# Patient Record
Sex: FEMALE | Race: OTHER | URBAN - METROPOLITAN AREA
[De-identification: names, ages, dates, MRNs, and addresses within clinical notes are randomized per-mention and may not be internally consistent; named-entity substitution may affect disease eponyms.]

---

## 2023-05-22 ENCOUNTER — NURSE ONLY (OUTPATIENT)
Age: 43
End: 2023-05-22

## 2023-05-22 DIAGNOSIS — Z71.89 COUNSELING AND COORDINATION OF CARE: Primary | ICD-10-CM

## 2023-05-22 NOTE — PROGRESS NOTES
Encounter completed via telephone call for enrollment in to [facility] and coordination of care. Patient was [facility] by [facility] due to a recent abnormal screening mammogram performed at [facility] 03/2023 needing follow up images. Patient was screen and qualifies for the [facility] program. During a previous tel. Encounter patient stated that she also needed assistance for cervical diagnotic work up as she had recently had an abnormal pap smear. This nurse requested imaging reports and previous films from the [facility] and the requested information was received. This nurse also requested records from [facility] and have been received and reviewed. Documents have been scanned in to media section. Patient was called and updated of the above information. Patient states that she would like to keep her appt. For imaging at the [facility] for tomorrow - patient aware that she will be self pay as we do not have a contract with that location. Patient understands and agrees. Patient would like our assistance in getting her cervical diagnostic work up through [facility], reviewed that I will have to call our referring OB/GYN office and request the next available time patient agrees. The  [facility] participation agreement was reviewed with patient over the phone. All questions answered. Patient would like to be enrolled in [facility] program and have Colposcopy Procedure. Colposcopy preparation instructions were reviewed. Next available appt. Was scheduled for patient and a text message as requested by patient was sent. A EWL packet was sent to patient to the address on file with disk from the [facility] that was received at our main office. All questions were answered, patient verbalized understating and agrees with plan.  Kwasi Casper RN

## 2023-06-21 ENCOUNTER — HOSPITAL ENCOUNTER (OUTPATIENT)
Facility: HOSPITAL | Age: 43
Setting detail: SPECIMEN
Discharge: HOME | End: 2023-06-24

## 2023-06-21 ENCOUNTER — OFFICE VISIT (OUTPATIENT)
Age: 43
End: 2023-06-21

## 2023-06-21 VITALS — SYSTOLIC BLOOD PRESSURE: 126 MMHG | DIASTOLIC BLOOD PRESSURE: 76 MMHG | WEIGHT: 166 LBS

## 2023-06-21 DIAGNOSIS — R87.619 ATYPICAL ENDOCERVICAL CELLS ON CERVICAL PAPANICOLAOU SMEAR: ICD-10-CM

## 2023-06-21 DIAGNOSIS — R87.619 ATYPICAL GLANDULAR CELLS ON CERVICAL PAP SMEAR: ICD-10-CM

## 2023-06-21 LAB
HCG, PREGNANCY, URINE, POC: NEGATIVE
VALID INTERNAL CONTROL, POC: YES

## 2023-06-21 NOTE — PROGRESS NOTES
Abnormal Pap Problem Visit  Chief Complaint   Patient presents with    Abnormal Pap Smear           Colposcopy       Patty Montalvo is a 36 y.o.  presenting for problem visit for discussion and management of an abnormal pap test.  She had a pap test that was done through [facility] that demonstrated atypical endocervical cells (AGC- NOS) and HPV negative. She has never had the Gardasil vaccination. She has no history of abnormal pap tests. She has routine pap test screening. Session [id number]    Ob/Gyn Hx:    - 4 vaginal deliveries. LMP- 06/10/2024  Menses- regular  Contraception- none  SA- yes      Past Medical History:   Diagnosis Date    Abnormal Pap smear of cervix        No past surgical history on file. No family history on file. Social History     Socioeconomic History    Marital status: Single     Spouse name: Not on file    Number of children: Not on file    Years of education: Not on file    Highest education level: Not on file   Occupational History    Not on file   Tobacco Use    Smoking status: Never    Smokeless tobacco: Never   Vaping Use    Vaping Use: Never used   Substance and Sexual Activity    Alcohol use: Yes    Drug use: Never    Sexual activity: Not on file   Other Topics Concern    Not on file   Social History Narrative    Not on file     Social Determinants of Health     Financial Resource Strain: Not on file   Food Insecurity: Not on file   Transportation Needs: Not on file   Physical Activity: Not on file   Stress: Not on file   Social Connections: Not on file   Intimate Partner Violence: Not on file   Housing Stability: Not on file       No current outpatient medications on file. No current facility-administered medications for this visit.        No Known Allergies    Review of Systems - History obtained from the patient  Constitutional: negative for weight loss, fever, night sweats  HEENT: negative for hearing loss, earache, congestion, snoring,

## 2023-06-21 NOTE — CONSULTS
Session ID: [id number]  Request ID: [id number]  Language: English  Status: Fulfilled   ID: #[id number]   Name: Luis Antonio Momin

## 2023-07-03 ENCOUNTER — TELEPHONE (OUTPATIENT)
Age: 43
End: 2023-07-03

## 2023-07-03 NOTE — TELEPHONE ENCOUNTER
Patient had a colposcopy with a biopsy on 6/21/2023 through [facility]   - the cervical biopsy has not been reviewed and resulted by the GYN provider. Calling patient to let her know that we are following her and that we are monitoring her results.  Patient did not answer and voicemail was full Nixon Garcia RN

## 2023-07-10 ENCOUNTER — TELEPHONE (OUTPATIENT)
Age: 43
End: 2023-07-10

## 2023-11-07 ENCOUNTER — TELEPHONE (OUTPATIENT)
Age: 43
End: 2023-11-07

## 2023-11-07 NOTE — TELEPHONE ENCOUNTER
Chart reviewed- pt has an appt. Scheduled with Dr. Taz Arvizu for a repeat pap smear 6 months post colposcopy. Offer patient pap smear appt. With [facility] clinic, pt states that she prefers to go to [address] [place] [address] OB/GYN due to transportation issues. [address] [place] [address] [facility] [employer] [facility] program will cover this 6 month pap smear appt. Patient's eligibility for the [employer] program was reassess today- per patient her income, household and insurance status has not change, demographic information up to date in [address] [place] [address]. Pt is aware that in income, household number or insurance status changes pt needs to contact our office to reassess eligibility. An appt. For screening mammogram for 3/2024 provided- will request most recent breast imaging report from [facility] [address], if appt. Needs to be changed patient will be contacted. This has been fully explained to the patient, who indicates understanding and agrees with plan. No further questions at this time. Todd Porras RN      A message to Manda Jeff requesting to direct billing for colposcopy office visit on 06/2023 at [address] [place] [address] OB/GYN was sent on 9/15/2023 and again 11/07/23. - pt was made aware of this, and I apologized that this is taking a long time to resolve.  Todd Porras RN

## 2024-01-09 ENCOUNTER — OFFICE VISIT (OUTPATIENT)
Age: 44
End: 2024-01-09

## 2024-01-09 VITALS — SYSTOLIC BLOOD PRESSURE: 130 MMHG | WEIGHT: 169 LBS | DIASTOLIC BLOOD PRESSURE: 84 MMHG

## 2024-01-09 DIAGNOSIS — Z12.4 ENCOUNTER FOR REPEAT PAPANICOLAOU SMEAR OF CERVIX: Primary | ICD-10-CM

## 2024-01-09 PROCEDURE — 99213 OFFICE O/P EST LOW 20 MIN: CPT | Performed by: OBSTETRICS & GYNECOLOGY

## 2024-01-09 NOTE — PROGRESS NOTES
present, no inflammatory lesions present, no masses present  Bladder: non-tender to palpation  Urethra: appears normal  Cervix: enlarged, firm, vascular nabothian cyst noted at 12 o clock with prominent blood vessels   Uterus: normal size, shape and consistency  Adnexa: no adnexal tenderness present, no adnexal masses present  Perineum: perineum within normal limits, no evidence of trauma, no rashes or skin lesions present    Skin  General Inspection: no rash, no lesions identified    Neurologic/Psychiatric  Mental Status:  Orientation: grossly oriented to person, place and time  Mood and Affect: mood normal, affect appropriate      Assessment/Plan:    1. Encounter for repeat Papanicolaou smear of cervix  - Discussed repeat pap test today  - Discussed recommendation for repeat colposcopy if pap test continues to be abnormal  - PAP IG, Aptima HPV and rfx 16/18,45 ([id number]) ([facility])    Harper Sanders MD

## 2024-01-16 LAB
CYTOLOGIST CVX/VAG CYTO: ABNORMAL
CYTOLOGY CVX/VAG DOC CYTO: ABNORMAL
CYTOLOGY CVX/VAG DOC THIN PREP: ABNORMAL
DX ICD CODE: ABNORMAL
DX ICD CODE: ABNORMAL
HPV GENOTYPE REFLEX: ABNORMAL
HPV I/H RISK 4 DNA CVX QL PROBE+SIG AMP: NEGATIVE
Lab: ABNORMAL
OTHER STN SPEC: ABNORMAL
PATHOLOGIST CVX/VAG CYTO: ABNORMAL
STAT OF ADQ CVX/VAG CYTO-IMP: ABNORMAL

## 2024-01-19 ENCOUNTER — TELEPHONE (OUTPATIENT)
Age: 44
End: 2024-01-19

## 2024-01-22 ENCOUNTER — TELEPHONE (OUTPATIENT)
Age: 44
End: 2024-01-22

## 2024-01-22 NOTE — TELEPHONE ENCOUNTER
Calling patient to assist and coordinate colposcopy appt. With . per chart reviewed Dr. Sanders tried calling patient to go over pap smear results from 1/9/2024- pt did not answer. Pt needs Colposcopy and [facility] can assist in coordinating. Patient did not answer to this phone call either left message to call back. Deborah Dozier RN

## 2024-01-22 NOTE — TELEPHONE ENCOUNTER
Patient called back ID [id number].   Patient has not called [facility]. I was able to reviewed the chart and provide her with Dr. Sanders results and recommendations.     Patient is agreeable to colposcopy with Dr. Sanders and to have this done through the [facility] program.  I have coordinated colposcopy appt. Next available. Patient was provided the information verbally. Patient does not have additional questions, at this time. Deborah Dozier RN    I am assisting patient in transferring previous DOS charges at [facility] to [facility]. I sent an encrypted email to [facility] on 1/19/2024. Deborah Dozier RN

## 2024-02-13 ENCOUNTER — TELEPHONE (OUTPATIENT)
Age: 44
End: 2024-02-13

## 2024-02-14 ENCOUNTER — OFFICE VISIT (OUTPATIENT)
Age: 44
End: 2024-02-14

## 2024-02-14 ENCOUNTER — HOSPITAL ENCOUNTER (OUTPATIENT)
Facility: HOSPITAL | Age: 44
Setting detail: SPECIMEN
Discharge: HOME | End: 2024-02-17

## 2024-02-14 VITALS — SYSTOLIC BLOOD PRESSURE: 126 MMHG | WEIGHT: 170 LBS | DIASTOLIC BLOOD PRESSURE: 80 MMHG

## 2024-02-14 DIAGNOSIS — R87.610 ATYPICAL SQUAMOUS CELLS OF UNDETERMINED SIGNIFICANCE (ASCUS) ON PAPANICOLAOU SMEAR OF CERVIX: Primary | ICD-10-CM

## 2024-02-14 LAB
HCG, PREGNANCY, URINE, POC: NEGATIVE
VALID INTERNAL CONTROL, POC: YES

## 2024-02-14 NOTE — PROGRESS NOTES
Abnormal Pap Problem Visit    Karla Pablo is a 43 y.o.  presenting for problem visit for discussion and management of an abnormal pap test.  She has a history of abnormal pap tests.    Per 2023 note-  She had a pap test that was done through [facility] that demonstrated atypical endocervical cells (AGC- NOS) and HPV negative. She has never had the Gardasil vaccination. She has no history of abnormal pap tests.      Colpo result- 2023  1.  Cervix, 4:00, biopsy: Ectocervical mucosa with marked acute cervicitis with reactive changes; dysplasia is not identified.   2.  Endocervix, curettage: Abundant endocervical mucosa with focal mild atypia (see Comment).   3.  Endometrial polyp:Fragments of benign endometrial polyp. Fragments of proliferative pattern endometrium.     She presents today after repeat pap test that demonstrated ASCUS, HPV negative.    Given history of abnormal pap, recommendation made for repeat colposcopy despite ASCUS, HPV negative pap test.    Ob/Gyn Hx:    - 4 term deliveries. 1 AB  LMP- 02/10/2024  Menses- regular  Contraception- none  SA- yes    Past Medical History:   Diagnosis Date    Abnormal Pap smear of cervix        No past surgical history on file.    No family history on file.    Social History     Socioeconomic History    Marital status: Single     Spouse name: Not on file    Number of children: Not on file    Years of education: Not on file    Highest education level: Not on file   Occupational History    Not on file   Tobacco Use    Smoking status: Never    Smokeless tobacco: Never   Vaping Use    Vaping Use: Never used   Substance and Sexual Activity    Alcohol use: Yes    Drug use: Never    Sexual activity: Yes     Partners: Male   Other Topics Concern    Not on file   Social History Narrative    Not on file     Social Determinants of Health     Financial Resource Strain: Not on file   Food Insecurity: Not on file   Transportation Needs: Not on file

## 2024-03-07 ENCOUNTER — TRANSCRIBE ORDERS (OUTPATIENT)
Facility: HOSPITAL | Age: 44
End: 2024-03-07

## 2024-03-07 DIAGNOSIS — Z12.31 BREAST CANCER SCREENING BY MAMMOGRAM: Primary | ICD-10-CM

## 2024-03-13 ENCOUNTER — TELEPHONE (OUTPATIENT)
Age: 44
End: 2024-03-13

## 2024-03-13 NOTE — TELEPHONE ENCOUNTER
Called patient to remind of upcoming appointment with [facility] on 3/21/2024- no answer. Left message with appt. Details and [facility] hotline in case that she wants to cancel/reschedule.  Lonny Gimenez

## 2024-03-21 ENCOUNTER — HOSPITAL ENCOUNTER (OUTPATIENT)
Facility: HOSPITAL | Age: 44
Discharge: HOME | End: 2024-03-24

## 2024-03-21 ENCOUNTER — OFFICE VISIT (OUTPATIENT)
Age: 44
End: 2024-03-21

## 2024-03-21 VITALS — WEIGHT: 167 LBS | BODY MASS INDEX: 28.51 KG/M2 | HEIGHT: 64 IN

## 2024-03-21 DIAGNOSIS — R92.8 ABNORMAL MAMMOGRAM OF RIGHT BREAST: Primary | ICD-10-CM

## 2024-03-21 DIAGNOSIS — Z01.419 ENCOUNTER FOR WELL WOMAN EXAM: Primary | ICD-10-CM

## 2024-03-21 DIAGNOSIS — Z12.31 BREAST CANCER SCREENING BY MAMMOGRAM: ICD-10-CM

## 2024-03-21 PROCEDURE — 77063 BREAST TOMOSYNTHESIS BI: CPT

## 2024-03-21 NOTE — PROGRESS NOTES
Assessment/Plan:    Karla was seen today for well woman visit.    Diagnoses and all orders for this visit:    Encounter for well woman exam        No follow-up provider specified.    Paulette Whitt PA-C  SaskiaDeborah Pablo expressed understanding of this plan. An AVS was printed and given to the patient.      ----------------------------------------------------------------------    Chief Complaint   Patient presents with    Well Woman Visit     Screening mammogram        History of Present Illness:  [facility] annual well woman exam, this is her first visit with us  She is UTD on pap   She is    Having periods  No breast concerns or complaints  No risk for DV       Past Medical History:   Diagnosis Date    Abnormal Pap smear of cervix        No current outpatient medications on file.     No current facility-administered medications for this visit.       No Known Allergies    Social History     Tobacco Use    Smoking status: Never    Smokeless tobacco: Never   Vaping Use    Vaping Use: Never used   Substance Use Topics    Alcohol use: Yes    Drug use: Never       No family history on file.    Physical Exam:     LMP 2024 (Approximate)     A&Ox3  WDWN NAD  Respirations normal and non labored  Breast exam- leeanne neg for mass, tenderness, skin color changes, dimpling or retractions. Fibroglandular changes present     
  []                                       Are you taking hormone replacement therapy (HRT)     No Yes Comments   [x]                                  []                                       How many times have you been pregnant?   5        Number of live births ? 4    Are you experiencing any of the following?   No Yes Comments   Nipple Discharge [x]                                  []                                     Breast Lump/Masses [x]                                  []                                     Breast Skin Changes [x]                                  []                                          No Yes Comments   Vaginal Discharge [x]                                  []                                     Abnormal/unusual vaginal bleeding [x]                                  []                                         Are you experiencing any other health problems?  None reported- CVAN information provided to establish care      Age at first period? 11  Age at first birth? 16    Ht-- 5'4    Wt-- 167 lbs

## 2024-03-22 ENCOUNTER — TELEPHONE (OUTPATIENT)
Age: 44
End: 2024-03-22

## 2024-03-22 NOTE — TELEPHONE ENCOUNTER
The  services used. Pt informed that additional images are needed after her mammogram. Explained to pt why additional images needed, Pt verbalized understanding and denies questions.Pt would like to go to [facility] for her additional images.Pt understands she will get a call from the  to set up the appt. Pt has no further questions or concerns at this time. Email sent to get pt scheduled for additional images. LOWELL MORE RN

## 2024-03-25 ENCOUNTER — PATIENT MESSAGE (OUTPATIENT)
Age: 44
End: 2024-03-25

## 2024-03-29 NOTE — TELEPHONE ENCOUNTER
Received pictures from Bills that should be directed to [facility].   JEANMARIE- [facility]- I have sent a message today 03/29/24  to vendor asking to route charges to our program account.   [facility]- pending.  
[[id number]],[[id number]]

## 2024-04-03 ENCOUNTER — HOSPITAL ENCOUNTER (OUTPATIENT)
Facility: HOSPITAL | Age: 44
Discharge: HOME | End: 2024-04-06

## 2024-04-03 VITALS — BODY MASS INDEX: 28.51 KG/M2 | WEIGHT: 167 LBS | HEIGHT: 64 IN

## 2024-04-03 DIAGNOSIS — R92.8 ABNORMAL MAMMOGRAM OF RIGHT BREAST: ICD-10-CM

## 2024-04-03 PROCEDURE — 76642 ULTRASOUND BREAST LIMITED: CPT

## 2024-04-03 PROCEDURE — G0279 TOMOSYNTHESIS, MAMMO: HCPCS

## 2025-01-02 ENCOUNTER — TELEPHONE (OUTPATIENT)
Age: 45
End: 2025-01-02

## 2025-01-02 NOTE — TELEPHONE ENCOUNTER
Patient returned our call and I connected her with Milana to screen and help her get scheduled. DONAVON COPELAND RN

## 2025-01-02 NOTE — TELEPHONE ENCOUNTER
Unable to leave a voice mail for patient. She needs a repeat pap in February 2025 as follow up to colposcopy. She is due for her mammogram as well in March 2025. I will mail patient a certified letter since her phone is not accepting messages. This will be my 3rd attempt to reach patient. DONAVON COPELAND RN

## 2025-01-02 NOTE — TELEPHONE ENCOUNTER
The  services used. Patient's eligibility for the [facility] program was assessed today- per patient's income, household # and lack of health/medical insurance, pt is eligible for [facility] program. Demographic information up to date in epic. Made appt for follow up pap and screening mammogram. LOWELL MORE RN

## 2025-02-04 ENCOUNTER — TELEPHONE (OUTPATIENT)
Age: 45
End: 2025-02-04

## 2025-02-04 NOTE — TELEPHONE ENCOUNTER
Patient calling the [facility] main office requesting to cancel her appointment for pap smear 3/17/2025- pt was made aware of importance of test. Pt states that she feels clinic is to far and she will look for a different place to have pap smear done. Offered health department information pt kindly declined. Pt request to keep appt for mammogram at [facility] only. Appt. Cancel as requested.  Deborah Dozier RN

## 2025-06-19 ENCOUNTER — HOSPITAL ENCOUNTER (OUTPATIENT)
Facility: HOSPITAL | Age: 45
Discharge: HOME | End: 2025-06-22

## 2025-06-19 ENCOUNTER — OFFICE VISIT (OUTPATIENT)
Age: 45
End: 2025-06-19

## 2025-06-19 VITALS — BODY MASS INDEX: 28.51 KG/M2 | WEIGHT: 167 LBS | HEIGHT: 64 IN

## 2025-06-19 DIAGNOSIS — Z12.31 VISIT FOR SCREENING MAMMOGRAM: ICD-10-CM

## 2025-06-19 DIAGNOSIS — Z12.31 SCREENING MAMMOGRAM, ENCOUNTER FOR: Primary | ICD-10-CM

## 2025-06-19 PROCEDURE — 77063 BREAST TOMOSYNTHESIS BI: CPT

## 2025-06-19 NOTE — PROGRESS NOTES
Karla Pablo is a 44 y.o. female   Chief Complaint   Patient presents with    [facility]         ASSESSMENT AND PLAN:    1. Screening mammogram, encounter for        SUBJECTIVE:    HPI:  Karla Pablo is a 44 y.o. female who presents for screening mammogram. No breast concerns or changes.    Last mammogram: 3/2024 - R breast focal asymmetry --> Diagnostic done on 4/3/24 - BI-RADS 2, several simple cysts in right lateral breast, benign.    LMP 05/23/2025     Physical Exam     Const: A&Ox3, NAD  Resp: nonlabored  Chest: bilateral breasts symmetric, no masses, non tender, no nipple discharge or skin changes, no axillary lymphadenopathy  Psych: mood and affect appropriate

## 2025-06-19 NOTE — PROGRESS NOTES
[facility] HISTORY QUESTIONNAIRE     used  [] No    [x]   Yes    Ht--5'4\"    Wt--167    Do you have any breast concerns today?  ___none___    Are you experiencing any of the following?   No Yes Comments   Nipple Discharge [x]                                  []                                     Breast Lump/Masses [x]                                  []                                     Breast Skin Changes [x]                                  []                                           When and where was last mammogram performed?  __3/21/2024 __[facility]     No Yes Comments   Have you ever had a mammogram that required additional follow up?  []     [x]     Pt was seen previously for dx, results benign.   Have you ever had a breast biopsy? [x]                                  []                                     Do you have breast implants?  [x]        []                When and where was your last Pap test performed? ____  1/9/2024 [facility] pt declines pelvic    Have you ever had an abnormal Pap test? ( Please note, if Hx of Colposcopy, LEEP, CKC, [place] 2 or 3)  No Yes Comments   []                                  [x]                                  Colpo 2/14/2024     Have you been through menopause?   No Yes Date of LMP   [x]                                  []                                  5/23/25     For patients who are still menstruating: Do you use any form of family planning? None used    Have you had a hysterectomy?   No Yes Comments (why)   [x]                                  []                                        No Yes Comments   Vaginal Discharge [x]                                  []                                     Abnormal/unusual vaginal bleeding    (Post menopausal  [x]                                  []                                       Did your mother take JOANNA?  No Yes Unknown   []                                  []                                  [x]

## 2025-06-24 ENCOUNTER — RESULTS FOLLOW-UP (OUTPATIENT)
Age: 45
End: 2025-06-24